# Patient Record
Sex: MALE | Race: WHITE | NOT HISPANIC OR LATINO | URBAN - METROPOLITAN AREA
[De-identification: names, ages, dates, MRNs, and addresses within clinical notes are randomized per-mention and may not be internally consistent; named-entity substitution may affect disease eponyms.]

---

## 2024-01-14 ENCOUNTER — OFFICE VISIT (OUTPATIENT)
Dept: URGENT CARE | Facility: CLINIC | Age: 19
End: 2024-01-14
Payer: COMMERCIAL

## 2024-01-14 VITALS
BODY MASS INDEX: 24.15 KG/M2 | TEMPERATURE: 99 F | HEART RATE: 107 BPM | OXYGEN SATURATION: 97 % | RESPIRATION RATE: 18 BRPM | SYSTOLIC BLOOD PRESSURE: 118 MMHG | WEIGHT: 172.5 LBS | HEIGHT: 71 IN | DIASTOLIC BLOOD PRESSURE: 71 MMHG

## 2024-01-14 DIAGNOSIS — J10.1 INFLUENZA A: Primary | ICD-10-CM

## 2024-01-14 LAB
CTP QC/QA: YES
MOLECULAR STREP A: NEGATIVE
POC MOLECULAR INFLUENZA A AGN: POSITIVE
POC MOLECULAR INFLUENZA B AGN: NEGATIVE
SARS-COV-2 AG RESP QL IA.RAPID: NEGATIVE

## 2024-01-14 PROCEDURE — 87651 STREP A DNA AMP PROBE: CPT | Mod: QW,S$GLB,, | Performed by: PHYSICIAN ASSISTANT

## 2024-01-14 PROCEDURE — 99203 OFFICE O/P NEW LOW 30 MIN: CPT | Mod: S$GLB,,, | Performed by: PHYSICIAN ASSISTANT

## 2024-01-14 PROCEDURE — 87502 INFLUENZA DNA AMP PROBE: CPT | Mod: QW,S$GLB,, | Performed by: PHYSICIAN ASSISTANT

## 2024-01-14 PROCEDURE — 87811 SARS-COV-2 COVID19 W/OPTIC: CPT | Mod: QW,S$GLB,, | Performed by: PHYSICIAN ASSISTANT

## 2024-01-14 RX ORDER — BALOXAVIR MARBOXIL 40 MG/1
40 TABLET, FILM COATED ORAL ONCE
Qty: 1 TABLET | Refills: 0 | Status: SHIPPED | OUTPATIENT
Start: 2024-01-14 | End: 2024-01-14

## 2024-01-14 NOTE — LETTER
January 14, 2024      [facility] [address] [place] [address]  Phone: [phone]  Fax: [phone]       Patient: Keyon Martinez   YOB: 2005  Date of Visit: 01/14/2024    To Whom It May Concern:    Ac Martinez  was at [facility] on 01/14/2024. The patient was diagnosed with Influenza A. He may return to work/school on 01/18/24 with restrictions to return if 24 hours without fever. If you have any questions or concerns, or if I can be of further assistance, please do not hesitate to contact me.    Sincerely,    Damaris Robin PA-C  [facility]

## 2024-01-14 NOTE — PATIENT INSTRUCTIONS
For sore throat - Salt water gargles, ibuprofen or naproxen for pain. Rest and drink plenty of fluids. May buy over the counter Chloraseptic Lozenges or spray for severe pain. You can add zyrtec if runny nose or post nasal drip worsens.     You have been diagnosed with Influenza.   You are contagious for usually the first 4 days of symptoms and for 24 hours after your last fever.  Please drink plenty of fluids.  Please get plenty of rest.  Please return here or go to the Emergency Department for any concerns or worsening of condition.  If an antiviral prescription such as Tamiflu or Xofluza has been discussed and if prescribed, please take to completion unless you cannot tolerate the side effects.   Take tylenol (acetominophen) for fever, chills or body aches every 4 hours. do not exceed 4000 mg/ day.  OR you may take Motrin (Ibuprofen) 600mg every 6 hours for fever, chills, pain or inflammation.  Cough and Cold Medications as needed. Use an antihistmine such as claritin or zyrtec to dry you out if you have a runny nose or postnasal drip. Use pseudoephedrine (behind the counter) to decongest (be aware this can raise your blood pressure and may cause palpitations. Delsym or Robitussin for cough. Use mucinex (guaifenisin) to break up mucous. Mucinex DM is helpful if you are congested and have a cough.

## 2024-01-14 NOTE — PROGRESS NOTES
"Subjective:      Patient ID: Keyon Martinez is a 18 y.o. male.    Vitals:  height is 5' 11" (1.803 m) and weight is 78.3 kg (172 lb 8.2 oz). His oral temperature is 99.3 °F (37.4 °C). His blood pressure is 118/71 and his pulse is 107. His respiration is 18 and oxygen saturation is 97%.     Chief Complaint: Fever    Patient in today with headache and fever (99.3) Taken at home (100.2) Patient complained about sore throat.mild runny nose. No congestion, cough, cp, sob.     Fever   This is a new problem. The current episode started today. The problem has been gradually worsening. The maximum temperature noted was 99 to 99.9 F. The temperature was taken using an oral thermometer. Associated symptoms include headaches, muscle aches and a sore throat. Pertinent negatives include no abdominal pain, chest pain, congestion, coughing, diarrhea, ear pain, nausea or vomiting. Treatments tried: Patient stated he took tylenol this morning.       Constitution: Positive for fatigue and fever.   HENT:  Positive for postnasal drip and sore throat. Negative for ear pain and congestion.    Cardiovascular:  Negative for chest pain.   Eyes:  Negative for eye discharge, eye itching and eye pain.   Respiratory:  Negative for cough, sputum production and shortness of breath.    Gastrointestinal:  Negative for abdominal pain, nausea, vomiting and diarrhea.   Neurological:  Positive for headaches.      Objective:     Physical Exam   Constitutional: He is oriented to person, place, and time. He appears well-developed. He is cooperative.  Non-toxic appearance. He does not appear ill. No distress.   HENT:   Head: Normocephalic and atraumatic.   Ears:   Right Ear: Hearing, tympanic membrane, external ear and ear canal normal.   Left Ear: Hearing, tympanic membrane, external ear and ear canal normal.   Nose: Nose normal. No mucosal edema, rhinorrhea or nasal deformity. No epistaxis. Right sinus exhibits no maxillary sinus tenderness and no frontal sinus " tenderness. Left sinus exhibits no maxillary sinus tenderness and no frontal sinus tenderness.   Mouth/Throat: Uvula is midline and mucous membranes are normal. No trismus in the jaw. Normal dentition. No uvula swelling. Posterior oropharyngeal erythema present. No oropharyngeal exudate or posterior oropharyngeal edema.   Eyes: Conjunctivae and lids are normal. No scleral icterus.   Neck: Trachea normal and phonation normal. Neck supple. No edema present. No erythema present. No neck rigidity present.   Cardiovascular: Regular rhythm, normal heart sounds and normal pulses. Tachycardia present.   Pulmonary/Chest: Effort normal and breath sounds normal. No respiratory distress. He has no decreased breath sounds. He has no wheezes. He has no rhonchi. He has no rales.   Abdominal: Normal appearance. There is no abdominal tenderness.   Musculoskeletal: Normal range of motion.         General: No deformity. Normal range of motion.   Neurological: He is alert and oriented to person, place, and time. He exhibits normal muscle tone. Coordination normal.   Skin: Skin is warm, dry, intact, not diaphoretic and not pale.   Psychiatric: His speech is normal and behavior is normal. Judgment and thought content normal.   Nursing note and vitals reviewed.    Results for orders placed or performed in visit on 01/14/24   POCT Strep A, Molecular   Result Value Ref Range    Molecular Strep A, POC Negative Negative     Acceptable Yes    POCT Influenza A/B Molecular   Result Value Ref Range    POC Molecular Influenza A Ag Positive (A) Negative, Not Reported    POC Molecular Influenza B Ag Negative Negative, Not Reported     Acceptable Yes    SARS Coronavirus 2 Antigen, POCT Manual Read   Result Value Ref Range    SARS Coronavirus 2 Antigen Negative Negative     Acceptable Yes        Assessment:     1. Influenza A        Plan:      Offered antiviral rx. Discussed benefits and side effects. Pt  would like to try. Rx sent to pharmacy. Recommend supportive care measures, cough and cold medications as needed, and increased fluids.      Influenza A  -     POCT Strep A, Molecular  -     POCT Influenza A/B Molecular  -     SARS Coronavirus 2 Antigen, POCT Manual Read    Other orders  -     Discontinue: baloxavir marboxiL (XOFLUZA) 40 mg tablet; Take 1 tablet (40 mg total) by mouth once. for 1 dose  Dispense: 1 tablet; Refill: 0  -     baloxavir marboxiL (XOFLUZA) 40 mg tablet; Take 1 tablet (40 mg total) by mouth once. for 1 dose  Dispense: 1 tablet; Refill: 0    Damaris Robin PA-C  [facility]